# Patient Record
Sex: FEMALE | Race: WHITE | Employment: FULL TIME | URBAN - METROPOLITAN AREA
[De-identification: names, ages, dates, MRNs, and addresses within clinical notes are randomized per-mention and may not be internally consistent; named-entity substitution may affect disease eponyms.]

---

## 2023-08-23 ENCOUNTER — HOSPITAL ENCOUNTER (OUTPATIENT)
Age: 58
Setting detail: THERAPIES SERIES
Discharge: HOME | End: 2023-08-23
Payer: COMMERCIAL

## 2023-08-23 PROCEDURE — 97110 THERAPEUTIC EXERCISES: CPT

## 2023-08-23 PROCEDURE — 97161 PT EVAL LOW COMPLEX 20 MIN: CPT

## 2023-08-23 NOTE — CONSULTS
[] [address]  [address] [facility].  [phone]  F: [phone] [] 204 [facility]  [address]   [address]  P: [phone]  F: [phone] [] [address]  151 [facility]  P: [phone]  F: [phone] [] [place]: [phone]  F: [phone] [] 224 [facility]  [address] [facility]   [address]   P: [phone]  F: [phone]  [] [address] [facility].   P: [phone]  F: [phone] [] 205 [facility]  [address] [facility] [address]  P: [phone]  F: [phone] [] 224 [facility]  [address]  [place]: [phone]  F: [phone] [x] [facility] [address]  [place]: [phone]  F: [phone]      Physical Therapy Lower Extremity Evaluation    Date:  2023  Patient: Eh Styles  : 1965  MRN: [id number]  Physician: Dr Thu Jackson MD   Insurance: [facility]  40 hard limit. Medical Diagnosis: Bilat TKA  Rehab Codes: M25.561, M25.562  Onset date: 23  Next 's appt.: TBD    Subjective:   CC: Bilat anterior knee ache 7/10, decreased bilat knee ROM, Decreased bilat LE strength, difficulty with ADLs, Painful gait with RW. HPI: S/p bilat TKA 23 with 1 day inpt stay. PMHx: [] Unremarkable [] Diabetes [] HTN  [] Pacemaker   [] MI/Heart Problems [] Cancer [] Arthritis [x] Other:  See pt intake forms. [] Refer to full medical chart  In [facility]   No past medical history on file.   No past surgical history on

## 2023-08-25 ENCOUNTER — APPOINTMENT (OUTPATIENT)
Age: 58
End: 2023-08-25
Payer: COMMERCIAL

## 2023-08-28 ENCOUNTER — HOSPITAL ENCOUNTER (OUTPATIENT)
Age: 58
Setting detail: THERAPIES SERIES
Discharge: HOME | End: 2023-08-28
Payer: COMMERCIAL

## 2023-08-28 PROCEDURE — 97110 THERAPEUTIC EXERCISES: CPT

## 2023-08-28 NOTE — FLOWSHEET NOTE
to benefit from skilled physical therapy services in order to: Address impairments in bilat knee rom, pain, edema, strength limiting ADLs secondary bilat TKA. STG: (to be met in 9 treatments)  ? Pain:  Bilat knee ache to less than 3/10 to improve activity tolerance. ? ROM:  Bilat knee aarom 5-100 to improve ADL performance. ? Strength:  Bilat knee 4/5 to improve Adl performance. ? Function:  Pt indep with  feet to improve home ambulation. Patient to be independent with home exercise program as demonstrated by performance with correct form without cues. Demonstrate Knowledge of fall prevention  LTG: (to be met in 18 treatments)  ? Pain:  Bilat knee ache to less than 1/10 to improve activity tolerance. ? ROM:  Bilat knee aarom 0-120 to improve ADL performance. ? Strength:  Bilat knee 4+/5 to improve Adl performance. ? Function:  Pt indep without device 500 feet to allow improved community ambulation. LEFS less than 30% impaired. Pt. Education:  [x] Yes  [] No  [] Ex  Method of Education: [x] Verbal  [x] Demo  [] Written  Comprehension of Education:  [x] Verbalizes understanding. [x] Demonstrates understanding. [] Needs review. [] Demonstrates/verbalizes HEP/Ed previously given. Plan: [x] Continue current frequency toward long and short term goals. [x] Specific Instructions for subsequent treatments: progress poc as tolerated.       Time In: 1415            Time Out: 1510    Electronically signed by:  Wayne Rios, PT

## 2023-08-30 ENCOUNTER — HOSPITAL ENCOUNTER (OUTPATIENT)
Age: 58
Setting detail: THERAPIES SERIES
Discharge: HOME | End: 2023-08-30
Payer: COMMERCIAL

## 2023-08-30 PROCEDURE — 97110 THERAPEUTIC EXERCISES: CPT

## 2023-08-30 NOTE — FLOWSHEET NOTE
189 [facility], [address]  [address]  [phone]  F: [phone]     Physical Therapy Daily Treatment Note    Date:  2023  Patient Name:  Tara Lux    :  1965  MRN: [id number]  Physician: Dr Roberto Painter MD                                Insurance: [employer]  40 hard limit. Medical Diagnosis: Bilat TKA                       Rehab Codes: M25.561, M25.562  Onset date: 23               Next Dr's appt.: TBD     Visit# / total visits: 3/18    Cancels/No Shows: 0/0    Subjective:  Pt reports continued progress, swelling and pain slowly decreasing. Overall, pt very pleased with progress thus far. Pain:  [x] Yes  [] No Location: Bilat anterior knee  Pain Rating: (0-10 scale) 3/10  Pain altered Tx:  [] No  [] Yes  Action:  Comments:       Compliant with HEP. Pt reports bilat anterior knee ache rated at 3/10. Objective:           Precautions:      WBAT  Progressed ex per ex grid. R knee aarom 3-110, L knee aarom 1-108    Treatment provided: Therex per ex grid x 40 min. Vcs and demo for proper ex tech. Rationale bilat knee rom and strength. Ice bilateral anterior knees 10 min   Exercise Reps/ Time Weight/ Level Comments   Scifit 5 min L3    Gastroc stretch with slant board 15\" x 4 bilat     Heel toe raise 20 x     Squats 2 x 10      Bilat 3 way hip 10 x 0 lbs    LAQ 20 x bilat 0 lbs    Seated ham curl 20 x bilat 8 Lbs tube    Hamstring stretch with strap 15\" x 4 bilat     QS 5\" x 15     SLR 20     Hook lying hip add 5\" x 15     Hook lying clam 20 Yellow loop    Heel slide 20 bilat  Active to initiate then AA with strap                                                           Response to treatment:   Bilateral knee ache 4/10 upon completion  of stretching, returned to 3/10 post ice. Good capsular enfreel, slight increase in knee pain at end range stretching.         Treatment Charges: Mins Units   [x]

## 2023-09-05 ENCOUNTER — HOSPITAL ENCOUNTER (OUTPATIENT)
Age: 58
Setting detail: THERAPIES SERIES
Discharge: HOME | End: 2023-09-05
Payer: COMMERCIAL

## 2023-09-05 PROCEDURE — 97140 MANUAL THERAPY 1/> REGIONS: CPT

## 2023-09-05 PROCEDURE — 97110 THERAPEUTIC EXERCISES: CPT

## 2023-09-05 NOTE — FLOWSHEET NOTE
189 [facility], [address]  [facility] [address]  [phone]  F: [phone]     Physical Therapy Daily Treatment Note    Date:  2023  Patient Name:  Jarocho Clark    :  1965  MRN: [id number]  Physician: Dr Susie Sanford MD                                Insurance: [employer]  40 hard limit. Medical Diagnosis: Bilat TKA                       Rehab Codes: M25.561, M25.562  Onset date: 23               Next Dr's appt.: TBD     Visit# / total visits:     Cancels/No Shows: 0/0    Subjective:  Pt reports continued progress, still has difficulty sleeping due to pain. Follow-up with her surgeon this morning, pleased with progress thus far. Pt states she is going to go camping the end of next week, would like to begin practicing stairs. Pain:  [x] Yes  [] No Location: Bilat anterior knee  Pain Rating: (0-10 scale) 3/10  Pain altered Tx:  [] No  [] Yes  Action:    Comments:       Compliant with HEP. Pt reports bilat anterior knee ache rated at 3/10. Objective:  endrange discomfort bilaterally with both flexion and extension but sx are occurring further within ROM each visit. Precautions:   WBAT  Progressed ex per ex grid. R knee aarom 3-112, L knee aarom 1-110    Treatment provided: Therex per ex grid x 40 min. Vcs and demo for proper ex tech. Rationale bilat knee rom and strength.    Ice bilateral anterior knees 10 min     Exercise Reps/ Time Weight/ Level Comments   Scifit 5 min L3    Gastroc stretch with slant board 15\" x 4 bilat     Heel toe raise 20 x     Squats 2 x 10      Bilat 3 way hip 10 x 3 lbs    LAQ 20 x bilat 3 lbs    Seated ham curl 20 x bilat 8 Lbs tube    Hamstring stretch with strap 15\" x 4 bilat     QS 5\" x 15     SLR 20     Hook lying hip add 5\" x 15     Hook lying clam 20 Yellow loop    Heel slide 20 bilat  Active to initiate then AA with strap

## 2023-09-07 ENCOUNTER — HOSPITAL ENCOUNTER (OUTPATIENT)
Age: 58
Setting detail: THERAPIES SERIES
Discharge: HOME | End: 2023-09-07
Payer: COMMERCIAL

## 2023-09-07 PROCEDURE — 97110 THERAPEUTIC EXERCISES: CPT

## 2023-09-07 NOTE — FLOWSHEET NOTE
189 [facility], [address]  [address]  [phone]  F: [phone]     Physical Therapy Daily Treatment Note    Date:  2023  Patient Name:  Ivon Curtis    :  1965  MRN: [id number]  Physician: Dr Marla Dawn MD                                Insurance: [employer]MO  40 hard limit. Medical Diagnosis: Bilat TKA                       Rehab Codes: M25.561, M25.562  Onset date: 23               Next 's appt.: TBD     Visit# / total visits:     Cancels/No Shows: 0/0    Subjective:  Pt reports continued progress, still has difficulty sleeping due to pain. Follow-up with her surgeon this morning, pleased with progress thus far. Pt states she is going to go camping the end of next week, would like to begin practicing stairs. Pain:  [x] Yes  [] No Location: Bilat anterior knee  Pain Rating: (0-10 scale) 4/10  Pain altered Tx:  [] No  [] Yes  Action:    Comments:       Compliant with HEP. Pt reports bilat anterior knee ache rated at 3/10. Objective:  endrange discomfort bilaterally with both flexion and extension but sx are occurring further within ROM each visit. Precautions:   WBAT  Progressed ex per ex grid. R knee aarom 3-112, 2-125 PROM L knee aarom 2-110 degrees 2-118 PROM    Treatment provided: Therex per ex grid x 45 min. Vcs and demo for proper ex tech. Rationale bilat knee rom and strength.    Ice bilateral anterior knees 10 min     Exercise Reps/ Time Weight/ Level Comments   Scifit 5 min L3    Gastroc stretch with slant board 15\" x 4 bilat     Heel toe raise 20 x     Squats 2 x 10      Bilat 3 way hip 10 x 3 lbs    LAQ 20 x bilat 3 lbs    Seated ham curl 20 x bilat 8 Lbs tube    Hamstring stretch with strap 15\" x 4 bilat     QS 5\" x 15     SLR 20     Hook lying hip add 5\" x 15     Hook lying clam 20 x 5 \" Yellow loop    Heel slide 20 bilat  Active to initiate then AA with strap

## 2023-09-11 ENCOUNTER — HOSPITAL ENCOUNTER (OUTPATIENT)
Age: 58
Setting detail: THERAPIES SERIES
Discharge: HOME | End: 2023-09-11
Payer: COMMERCIAL

## 2023-09-11 PROCEDURE — 97110 THERAPEUTIC EXERCISES: CPT

## 2023-09-11 NOTE — FLOWSHEET NOTE
189 [facility], [address]  [address]  [phone]  F: [phone]     Physical Therapy Daily Treatment Note    Date:  2023  Patient Name:  Minerva Jean Baptiste    :  1965  MRN: [id number]  Physician: Dr Patricia Higgins MD                                Insurance: [employer]  40 hard limit. Medical Diagnosis: Bilat TKA                       Rehab Codes: M25.561, M25.562  Onset date: 23               Next 's appt.: TBD     Visit# / total visits:     Cancels/No Shows: 0/0    Subjective:      Pain:  [x] Yes  [] No Location: Bilat anterior knee  Pain Rating: (0-10 scale) 3/10  Pain altered Tx:  [] No  [] Yes  Action:    Comments:     Pt reports bilateral knee pain 3/10 today, still having difficulty sleeping at night. Otherwise, pt continues to be pleased with progress. Compliant with HEP. Pt reports bilat anterior knee ache rated at 3/10. Objective:  presents to clinic with kinesiotape applied to both knees, pt states tape was applied by a (PT) friend for sx reduction. Endrange discomfort bilaterally with both flexion and extension but sx are occurring further within ROM each visit. Precautions:   WBAT  Progressed ex per ex grid. R knee aarom 3-112, 2-125 PROM L knee aarom 2-110 degrees 2-118 PROM 23    Treatment provided: Therex per ex grid x 45 min. Vcs and demo for proper ex tech. Rationale bilat knee rom and strength.    Ice bilateral anterior knees 10 min     Exercise Reps/ Time Weight/ Level Comments   Scifit 5 min L3    Gastroc stretch with slant board 15\" x 4 bilat     Heel toe raise 20 x     Squats 2 x 10      Bilat 3 way hip 10x 3 lbs    LAQ 20 x bilat 3 lbs    Seated ham curl 20 x bilat 8 Lbs tube    Hamstring stretch with strap 15\" x 4 bilat     QS 5\" x 15     SLR 20     Hook lying hip add 5\" x 15     Hook lying clam 20 x 5 \" Yellow loop    Heel slide 20 bilat  Active to initiate

## 2023-09-13 ENCOUNTER — HOSPITAL ENCOUNTER (OUTPATIENT)
Age: 58
Setting detail: THERAPIES SERIES
Discharge: HOME | End: 2023-09-13
Payer: COMMERCIAL

## 2023-09-13 PROCEDURE — 97110 THERAPEUTIC EXERCISES: CPT

## 2023-09-13 NOTE — FLOWSHEET NOTE
189 [facility], [address]  [address]  [phone]  F: [phone]     Physical Therapy Daily Treatment Note    Date:  2023  Patient Name:  Ivon Curtis    :  1965  MRN: [id number]  Physician: Dr Marla Dawn MD                                Insurance: [employer]  40 hard limit. Medical Diagnosis: Bilat TKA                       Rehab Codes: M25.561, M25.562  Onset date: 23               Next Dr's appt.: TBD     Visit# / total visits:     Cancels/No Shows: 0/0    Subjective:      Pain:  [x] Yes  [] No Location: Bilat anterior knee  Pain Rating: (0-10 scale) 3/10  Pain altered Tx:  [] No  [] Yes  Action:    Comments:     Pt reports bilateral knee pain 3/10 today. Pt states she had some bilateral \"shooting nerve-type pain\" randomly throughout the day. Objective:  Endrange discomfort bilaterally with both flexion and extension but sx are occurring further within ROM each visit. Precautions:   WBAT  Progressed ex per ex grid. R knee AAROM:  3-115, 2-120 PROM,  L knee AAROM 3-110, 2-115   23    Treatment provided: Therex per ex grid x 45 min. Vcs and demo for proper ex tech. Rationale bilat knee rom and strength.    Ice bilateral anterior knees 10 min     Exercise Reps/ Time Weight/ Level Comments   Scifit 5 min L3    Gastroc stretch with slant board 15\" x 4 bilat     Heel toe raise 20 x     Squats 2 x 10      Bilat 3 way hip 15x 3 lbs    LAQ 20 x bilat 3 lbs    Seated ham curl 20 x bilat 8 Lbs tube    Hamstring stretch with strap 15\" x 4 bilat     QS 5\" x 15     SLR 20     Hook lying hip add 5\" x 15     Hook lying clam 20 x 5 \" Yellow loop    Heel slide 20 bilat NP Active to initiate then AA with strap   Step-ups  X10  6\" Leading with each leg strengthening    Set-up to box X10  8\"  With cruz bar to replicate camper door                                                Response to treatment:

## 2023-09-18 ENCOUNTER — HOSPITAL ENCOUNTER (OUTPATIENT)
Age: 58
Setting detail: THERAPIES SERIES
Discharge: HOME | End: 2023-09-18
Payer: COMMERCIAL

## 2023-09-18 PROCEDURE — 97110 THERAPEUTIC EXERCISES: CPT

## 2023-09-18 NOTE — FLOWSHEET NOTE
frequency toward long and short term goals. [x] Specific Instructions for subsequent treatments: progress poc as tolerated.       Time In: 1415       Time Out: 1510    Electronically signed by:  Kassi Mejia PTA

## 2023-09-20 ENCOUNTER — HOSPITAL ENCOUNTER (OUTPATIENT)
Age: 58
Setting detail: THERAPIES SERIES
Discharge: HOME | End: 2023-09-20
Payer: COMMERCIAL

## 2023-09-20 PROCEDURE — 97110 THERAPEUTIC EXERCISES: CPT

## 2023-09-20 NOTE — FLOWSHEET NOTE
189 [facility], [address]  [address]  [phone]  F: [phone]     Physical Therapy Daily Treatment Note    Date:  2023  Patient Name:  Gayathri Jarrett    :  1965  MRN: [id number]  Physician: Dr Leana Benitez MD                                Insurance: MMO  40 hard limit. Medical Diagnosis: Bilat TKA                       Rehab Codes: M25.561, M25.562  Onset date: 23               Next Dr's appt.: TBD     Visit# / total visits:     Cancels/No Shows: 0/0    Subjective:      Pain:  [x] Yes  [] No Location: Bilat anterior knee  Pain Rating: (0-10 scale) 3/10  Pain altered Tx:  [] No  [] Yes  Action:    Comments:     Pt reports that her nerve pain continues to be pronounced in both of her knees-contacted surgeon and now has a script to Gabapentin-has to fill prescription. Objective:  Endrange discomfort bilaterally with both flexion and extension but sx are occurring further within ROM each visit-empty end feel due to pain. No mechanical dysfunctions, soft-tissue/nerve hypersensitivity producing current sx. Precautions:   WBAT  Progressed ex per ex grid. R knee AAROM:  3-120,  L knee AAROM 2-120,   23    Treatment provided: Therex per ex grid x 45 min. Vcs and demo for proper ex tech. Rationale bilat knee rom and strength.    Ice bilateral anterior knees 10 min     Exercise Reps/ Time Weight/ Level Comments   Scifit 5 min L3    Gastroc stretch with slant board 15\" x 4 bilat     Heel toe raise 20 x     Squats 2 x 10      Bilat 3 way hip 15x 3 lbs NP   LAQ 20 x bilat 3 lbs    Seated ham curl 20 x bilat 8 Lbs tube    Hamstring stretch with strap 20\" x 3 bilat  At steps    QS 5\" x 15  NP   SLR 20  NP   Hook lying hip add 5\" x 15     Hook lying clam 20 x 5 \" Yellow loop    Heel slide 20 bilat  Active to initiate then AA with strap   Step-ups  X10  6\" Leading with each leg strengthening

## 2023-09-25 ENCOUNTER — HOSPITAL ENCOUNTER (OUTPATIENT)
Age: 58
Setting detail: THERAPIES SERIES
Discharge: HOME | End: 2023-09-25
Payer: COMMERCIAL

## 2023-09-25 PROCEDURE — 97110 THERAPEUTIC EXERCISES: CPT

## 2023-09-25 NOTE — FLOWSHEET NOTE
189 [facility], [address]  [address]  [phone]  F: [phone]     Physical Therapy Daily Treatment Note    Date:  2023  Patient Name:  Minerva Jean Baptiste    :  1965  MRN: [id number]  Physician: Dr Patricia Higgins MD                                Insurance: [employer]  40 hard limit. Medical Diagnosis: Bilat TKA                       Rehab Codes: M25.561, M25.562  Onset date: 23               Next 's appt.: TBD     Visit# / total visits: 10/18    Cancels/No Shows: 0/0    Subjective:      Pain:  [x] Yes  [] No Location: Bilat anterior knee  Pain Rating: (0-10 scale) 3/10  Pain altered Tx:  [] No  [] Yes  Action:    Comments:     Pt reports that her nerve pain has decreased slightly since starting Gabapentin-sleeping better at night. Knee pain remains 3/10 throughout the day. Objective:  Endrange extension discomfort bilaterally-tightness posterior capsule. Flexion painful at end range but minimal to no end range tightness noted. Precautions:   WBAT    Right 3-110 active range, 2-120 passive range    Left active range 3-108, passive 2-118   23    Treatment provided: Therex per ex grid x 45 min. Vcs and demo for proper ex tech. Rationale bilat knee rom and strength.    Ice bilateral anterior knees 10 min     Exercise Reps/ Time Weight/ Level Comments   Scifit 5 min L4    Gastroc stretch with slant board 15\" x 4 bilat     Heel toe raise 20 x     Squats 2 x 10      Bilat 3 way hip 20 x 3 lbs     15x  3 lbs    LAQ 20 x bilat 3 lbs    Seated ham curl 20 x bilat 8 Lbs tube    Hamstring stretch with strap 20\" x 3 bilat  At steps    QS 5\" x 15     SLR 20     Hook lying hip add 5\" x 15     Hook lying clam 20 x 5 \" Yellow loop    Heel slide 20 bilat NP  Active to initiate then AA with strap   Step-ups  X10  6\" Leading with each leg strengthening    Lateral step-ups X15 4\"

## 2023-09-27 ENCOUNTER — HOSPITAL ENCOUNTER (OUTPATIENT)
Age: 58
Setting detail: THERAPIES SERIES
Discharge: HOME | End: 2023-09-27
Payer: COMMERCIAL

## 2023-09-27 PROCEDURE — 97110 THERAPEUTIC EXERCISES: CPT

## 2023-09-27 NOTE — FLOWSHEET NOTE
189 [facility], [address]  [address]  [phone]  F: [phone]     Physical Therapy Daily Treatment Note    Date:  2023  Patient Name:  Julee Drake    :  1965  MRN: [id number]  Physician: Dr Bard Felipe SHANKAR                                Insurance: [employer]  40 hard limit. Medical Diagnosis: Bilat TKA                       Rehab Codes: M25.561, M25.562  Onset date: 23               Next Dr's appt.: 10/3/23     Visit# / total visits:     Cancels/No Shows: 0/0    Subjective:     Pain:  [x] Yes  [] No Location: Bilat anterior knee  Pain Rating: (0-10 scale) 2.5/10  Pain altered Tx:  [] No  [] Yes  Action:    Comments:     Pt reports that her knee pain remains 3/10 throughout the day. Sleeping better at night since starting Gabapentin-minimal change in sx throughout the day. Objective:  Endrange extension discomfort bilaterally-tightness posterior capsule. Flexion painful at end range but minimal to no end range tightness noted. Precautions:   WBAT    Right 3-110 active range, 2-120 passive range    Left active range 3-108, passive 2-118   23    Treatment provided: Therex per ex grid x 45 min. Vcs and demo for proper ex tech. Rationale bilat knee rom and strength.    Ice bilateral anterior knees 10 min     Exercise Reps/ Time Weight/ Level Comments   Scifit 5 min L4    Gastroc stretch with slant board 15\" x 4 bilat     Heel toe raise 20 x     Squats 2 x 10      Bilat 3 way hip 20 x 3 lbs     15x  3 lbs    LAQ 20 x bilat 3 lbs    Seated ham curl 20 x bilat 8 Lbs tube    Hamstring stretch with strap 20\" x 3 bilat  At steps    Leg press bilaterally  2 x 10  40# Focus on TKE   Resisted TKE X 15  8#    Hook lying hip add 5\" x 15     Hook lying clam 20 x 5 \" Yellow loop    Heel slide 20 bilat NP  Active to initiate then AA with strap   Step-ups  X10  6\" Leading with each leg

## 2023-10-02 ENCOUNTER — HOSPITAL ENCOUNTER (OUTPATIENT)
Age: 58
Setting detail: THERAPIES SERIES
Discharge: HOME | End: 2023-10-02
Payer: COMMERCIAL

## 2023-10-02 PROCEDURE — 97110 THERAPEUTIC EXERCISES: CPT

## 2023-10-04 ENCOUNTER — HOSPITAL ENCOUNTER (OUTPATIENT)
Age: 58
Setting detail: THERAPIES SERIES
Discharge: HOME | End: 2023-10-04
Payer: COMMERCIAL

## 2023-10-04 PROCEDURE — 97110 THERAPEUTIC EXERCISES: CPT

## 2023-10-04 NOTE — FLOWSHEET NOTE
189 [facility], [address]  [address]  [phone]  F: [phone]     Physical Therapy Daily Treatment Note    Date:  10/4/2023  Patient Name:  Adela Crow    :  1965  MRN: [id number]  Physician: Dr Lashawn Mayer MD                                Insurance: [employer]  40 hard limit. Medical Diagnosis: Bilat TKA                       Rehab Codes: M25.561, M25.562  Onset date: 23               Next Dr's appt.: 10/3/23     Visit# / total visits:     Cancels/No Shows: 0/0    Subjective:     Pain:  [x] Yes  [] No Location: Bilat anterior knee  Pain Rating: (0-10 scale) 2/10  Pain altered Tx:  [x] No  [] Yes  Action:    Comments:     Pt reports that she saw her surgeon yesterday, prescribed a oral steroid for continued treatment of neuro pain and swelling. Pleased with knee ROM. Driving now short distances without difficulty. Objective:  Improved end feel noted in both flexion and extension bilaterally today. Soft-tissue hypersensitivity remains. Precautions:   WBAT    Right 3-112active range, 2-120 passive range    Left active range 3-111, passive 2-120  10/02/23    Treatment provided: Therex per ex grid x 45 min. Vcs and demo for proper ex tech. Rationale bilat knee rom and strength.    Ice bilateral anterior knees 10 min     Activities in bold performed 10-4-23  Exercise Reps/ Time Weight/ Level Comments   Scifit 5 min L4    Gastroc stretch with slant board 15\" x 4 bilat     Heel toe raise 20 x     Squats 2 x 10      Bilat 3 way hip 20 x 3 lbs    Repeated knee flexion on step  X 20 each   12\" step   SL march 15x  3 lbs    LAQ 20 x bilat 3 lbs    Seated ham curl 20 x bilat 8 Lbs tube    Hamstring stretch with strap 20\" x 3 bilat  At steps    Leg press bilaterally  2 x 10  40# Focus on TKE   Resisted TKE X 15  8#    Hook lying hip add 5\" x 15     Hook lying clam 20 x 5 \" Yellow loop    Heel slide 20

## 2023-10-13 ENCOUNTER — HOSPITAL ENCOUNTER (OUTPATIENT)
Age: 58
Setting detail: THERAPIES SERIES
Discharge: HOME | End: 2023-10-13
Payer: COMMERCIAL

## 2023-10-13 PROCEDURE — 97110 THERAPEUTIC EXERCISES: CPT

## 2023-10-13 NOTE — FLOWSHEET NOTE
knee aarom 0-120 to improve ADL performance. NOT MET  ? Strength:  Bilat knee 4+/5 to improve Adl performance. MET  ? Function:  Pt indep without device 500 feet to allow improved community ambulation. MET  LEFS less than 30% impaired. NOT MET       Pt. Education:  [x] Yes  [] No  [] Ex  Method of Education: [x] Verbal  [x] Demo  [x] Written  Comprehension of Education:  [x] Verbalizes understanding. [x] Demonstrates understanding. [] Needs review. [x] Demonstrates/verbalizes HEP/Ed previously given. Access Code: [email or web address]  URL: [email or web address]  Date: 10/13/2023  Prepared by: Betsy Majano    Exercises  - Standing Terminal Knee Extension with Resistance  - 2 x daily - 5 x weekly - 2 sets - 10-15 reps  - Prone Knee Extension with Ankle Weight  - 2 x daily - 5 x weekly - 2 sets - 1-2 minutes hold  - Supine Hamstring Stretch with Strap  - 2 x daily - 5 x weekly - 1 sets - 4 reps - 20 seconds hold  - Standing Hamstring Stretch with Step  - 2 x daily - 5 x weekly - 1 sets - 4 reps - 20 seconds hold  - Seated Hamstring Curl with Anchored Resistance  - 2 x daily - 5 x weekly - 2 sets - 10 reps  - Mini Lunge  - 2 x daily - 5 x weekly - 2 sets - 10 reps  - Mini Squat  - 2 x daily - 5 x weekly - 2 sets - 10 reps    Treatment to Date:  [x] Therapeutic Exercise    [] Modalities:  [] Therapeutic Activity    [] Ultrasound  [] Electrical Stimulation  [x] Gait Training     [] Massage       [] Lumbar/Cervical Traction  [] Neuromuscular Re-education [x] Cold/hotpack [] Iontophoresis: 4 mg/mL  [x] Instruction in Home Exercise Program                     Dexamethasone Sodium  [x] Manual Therapy             Phosphate 40-80 mAmin  [] Aquatic Therapy                   [] Vasocompression/    [] Other:             Game Ready    Discharge Status:     [] Pt recovered from conditions. Treatment goals were met. [x] Pt received maximum benefit. No further skilled therapy indicated at this time.     [x] Pt to continue exercise/home